# Patient Record
Sex: MALE | Race: WHITE
[De-identification: names, ages, dates, MRNs, and addresses within clinical notes are randomized per-mention and may not be internally consistent; named-entity substitution may affect disease eponyms.]

---

## 2018-03-13 ENCOUNTER — HOSPITAL ENCOUNTER (EMERGENCY)
Dept: HOSPITAL 17 - PHED | Age: 73
Discharge: HOME | End: 2018-03-13
Payer: COMMERCIAL

## 2018-03-13 VITALS
OXYGEN SATURATION: 97 % | DIASTOLIC BLOOD PRESSURE: 83 MMHG | RESPIRATION RATE: 16 BRPM | HEART RATE: 76 BPM | SYSTOLIC BLOOD PRESSURE: 132 MMHG

## 2018-03-13 VITALS
SYSTOLIC BLOOD PRESSURE: 138 MMHG | HEART RATE: 79 BPM | RESPIRATION RATE: 16 BRPM | DIASTOLIC BLOOD PRESSURE: 75 MMHG | OXYGEN SATURATION: 94 %

## 2018-03-13 VITALS
HEART RATE: 76 BPM | DIASTOLIC BLOOD PRESSURE: 80 MMHG | SYSTOLIC BLOOD PRESSURE: 126 MMHG | RESPIRATION RATE: 16 BRPM | OXYGEN SATURATION: 96 %

## 2018-03-13 VITALS
RESPIRATION RATE: 20 BRPM | DIASTOLIC BLOOD PRESSURE: 76 MMHG | OXYGEN SATURATION: 96 % | TEMPERATURE: 97.7 F | HEART RATE: 74 BPM | SYSTOLIC BLOOD PRESSURE: 151 MMHG

## 2018-03-13 VITALS — OXYGEN SATURATION: 94 %

## 2018-03-13 DIAGNOSIS — Z87.891: ICD-10-CM

## 2018-03-13 DIAGNOSIS — E11.9: ICD-10-CM

## 2018-03-13 DIAGNOSIS — J44.9: ICD-10-CM

## 2018-03-13 DIAGNOSIS — I10: ICD-10-CM

## 2018-03-13 DIAGNOSIS — R07.1: Primary | ICD-10-CM

## 2018-03-13 DIAGNOSIS — R05: ICD-10-CM

## 2018-03-13 DIAGNOSIS — E78.00: ICD-10-CM

## 2018-03-13 DIAGNOSIS — R94.31: ICD-10-CM

## 2018-03-13 LAB
ALBUMIN SERPL-MCNC: 3.7 GM/DL (ref 3.4–5)
ALP SERPL-CCNC: 78 U/L (ref 45–117)
ALT SERPL-CCNC: 21 U/L (ref 12–78)
AST SERPL-CCNC: 14 U/L (ref 15–37)
BASOPHILS # BLD AUTO: 0.1 TH/MM3 (ref 0–0.2)
BASOPHILS NFR BLD: 1 % (ref 0–2)
BILIRUB SERPL-MCNC: 0.4 MG/DL (ref 0.2–1)
BUN SERPL-MCNC: 13 MG/DL (ref 7–18)
CALCIUM SERPL-MCNC: 8.5 MG/DL (ref 8.5–10.1)
CHLORIDE SERPL-SCNC: 103 MEQ/L (ref 98–107)
CREAT SERPL-MCNC: 0.9 MG/DL (ref 0.6–1.3)
D-DIMER: (no result) MG/L FEU (ref 0–0.5)
EOSINOPHIL # BLD: 0.2 TH/MM3 (ref 0–0.4)
EOSINOPHIL NFR BLD: 2.8 % (ref 0–4)
ERYTHROCYTE [DISTWIDTH] IN BLOOD BY AUTOMATED COUNT: 13.6 % (ref 11.6–17.2)
GFR SERPLBLD BASED ON 1.73 SQ M-ARVRAT: 83 ML/MIN (ref 89–?)
GLUCOSE SERPL-MCNC: 151 MG/DL (ref 74–106)
HCO3 BLD-SCNC: 25.5 MEQ/L (ref 21–32)
HCT VFR BLD CALC: 44.5 % (ref 39–51)
HGB BLD-MCNC: 15.7 GM/DL (ref 13–17)
INR PPP: 1 RATIO
LYMPHOCYTES # BLD AUTO: 2.1 TH/MM3 (ref 1–4.8)
LYMPHOCYTES NFR BLD AUTO: 32.8 % (ref 9–44)
MAGNESIUM SERPL-MCNC: 2 MG/DL (ref 1.5–2.5)
MCH RBC QN AUTO: 30.5 PG (ref 27–34)
MCHC RBC AUTO-ENTMCNC: 35.2 % (ref 32–36)
MCV RBC AUTO: 86.8 FL (ref 80–100)
MONOCYTE #: 0.3 TH/MM3 (ref 0–0.9)
MONOCYTES NFR BLD: 5.3 % (ref 0–8)
NEUTROPHILS # BLD AUTO: 3.7 TH/MM3 (ref 1.8–7.7)
NEUTROPHILS NFR BLD AUTO: 58.1 % (ref 16–70)
PLATELET # BLD: 206 TH/MM3 (ref 150–450)
PMV BLD AUTO: 8.1 FL (ref 7–11)
PROT SERPL-MCNC: 6.9 GM/DL (ref 6.4–8.2)
PROTHROMBIN TIME: 10.5 SEC (ref 9.8–11.6)
RBC # BLD AUTO: 5.13 MIL/MM3 (ref 4.5–5.9)
SODIUM SERPL-SCNC: 138 MEQ/L (ref 136–145)
TROPONIN I SERPL-MCNC: (no result) NG/ML (ref 0.02–0.05)
WBC # BLD AUTO: 6.4 TH/MM3 (ref 4–11)

## 2018-03-13 PROCEDURE — 96374 THER/PROPH/DIAG INJ IV PUSH: CPT

## 2018-03-13 PROCEDURE — 80053 COMPREHEN METABOLIC PANEL: CPT

## 2018-03-13 PROCEDURE — 82550 ASSAY OF CK (CPK): CPT

## 2018-03-13 PROCEDURE — 71046 X-RAY EXAM CHEST 2 VIEWS: CPT

## 2018-03-13 PROCEDURE — 85610 PROTHROMBIN TIME: CPT

## 2018-03-13 PROCEDURE — 93005 ELECTROCARDIOGRAM TRACING: CPT

## 2018-03-13 PROCEDURE — 84484 ASSAY OF TROPONIN QUANT: CPT

## 2018-03-13 PROCEDURE — 85730 THROMBOPLASTIN TIME PARTIAL: CPT

## 2018-03-13 PROCEDURE — 83880 ASSAY OF NATRIURETIC PEPTIDE: CPT

## 2018-03-13 PROCEDURE — 82552 ASSAY OF CPK IN BLOOD: CPT

## 2018-03-13 PROCEDURE — 83735 ASSAY OF MAGNESIUM: CPT

## 2018-03-13 PROCEDURE — 99285 EMERGENCY DEPT VISIT HI MDM: CPT

## 2018-03-13 PROCEDURE — 85025 COMPLETE CBC W/AUTO DIFF WBC: CPT

## 2018-03-13 PROCEDURE — 85379 FIBRIN DEGRADATION QUANT: CPT

## 2018-03-13 NOTE — RADRPT
EXAM DATE/TIME:  03/13/2018 16:21 

 

[facility] COMPARISON:     

No previous studies available for comparison.

 

                     

INDICATIONS :     

Left side chest pain. Patient states he coughed and has had left side chest pain since. 

                     

 

MEDICAL HISTORY :     

Hypertension.       Bronchitis.    

 

SURGICAL HISTORY :     

None.   

 

ENCOUNTER:     

Initial                                        

 

ACUITY:     

1 day      

 

PAIN SCORE:     

8/10

 

LOCATION:     

Left chest 

 

FINDINGS:     

PA and lateral views of the chest demonstrate the lungs to be symmetrically aerated without evidence 
of mass, infiltrate or effusion.  The cardiomediastinal contours are unremarkable.  There is elevatio
n of the right hemidiaphragm. Osseous structures are intact.

 

CONCLUSION:     Elevation of the right hemidiaphragm. No acute focal pulmonary infiltrate or pulmonar
y vascular congestion. 

 

 

 Blayne Page MD on March 13, 2018 at 16:43           

Board Certified Radiologist.

 This report was verified electronically.

## 2018-03-13 NOTE — PD
HPI


Chief Complaint:  Pain: Acute or Chronic


Time Seen by Provider:  16:01


Travel History


International Travel<30 days:  No


Contact w/Intl Traveler<30days:  No


Traveled to known affect area:  No





History of Present Illness


HPI


This is a 73-year-old male presents here complaining of pain in his chest.  

Patient has been coughing for the last 2 weeks, cough is productive of yellow 

sputum.  Pain is 7 out of 10, located in the mid chest and radiates to the left

, worse when he coughs or takes a deep breath, sharp, no fever or chills or 

night sweats.  Patient reports taking amoxicillin for his bronchitis.  Patient 

has a history of COPD but does not use home oxygen.  He used to smoke but quit 

"many years ago".





PFSH


Past Medical History


Heart Rhythm Problems:  Yes


High Cholesterol:  Yes


COPD:  Yes


Diabetes:  Yes


Diminished Hearing:  No


Hypertension:  Yes


Respiratory:  Yes (BRONCHITIS)





Social History


Alcohol Use:  No


Tobacco Use:  No


Substance Use:  No





Allergies-Medications


(Allergen,Severity, Reaction):  


Coded Allergies:  


     No Known Allergies (Unverified , 3/13/15)


Reported Meds & Prescriptions





Reported Meds & Active Scripts


Active


Robitussin Nighttime Cough Liq (Doxylamine-Dextromethorphan Liq) 12.5-30 Mg/10 

Ml Soln 10 Ml PO Q6H PRN


EC-Naprosyn (Naproxen) 375 Mg Tabdr 375 Mg PO BID


Mobic (Meloxicam) 15 Mg Tab 15 Mg PO DAILY


Clindamycin Hcl (Clindamycin HCl) 150 Mg Cap 2 Tab PO QID


Reported


Symbicort (Budesonide/Formoterol Fumarate) 160 Mcg/4.5 Mcg Aer 2 Puff INH BID


     * SHAKE WELL BEFORE USE *


Hydrocodone/Acetaminophen (Miscellaneous Medication) 1 Tab 1 Tab PO Q4H PRN


Nitrostat (Nitroglycerin) 0.4 Mg Sub 0.4 Mg SL AS DIRECTED


Th Potassium Gluconate (Potassium Gluconate) 595 Mg Tab 595 Mg PO DAILY


Aspir-81 (Aspirin) 81 Mg Tab 81 Mg PO DAILY


Hydrochlorothiazide (Miscellaneous Medication) 12.5 Mg Cap 12.5 Mg PO DAILY


Glucophage 500 mg (Metformin HCl) 500 Mg Tab 500 Mg PO DAILY


Zocor 40 mg (Simvastatin) 40 Mg Tab 10 Mg PO HS


Flomax (Tamsulosin HCl) 0.4 Mg Cap 0.4 Mg PO DAILY








Review of Systems


Except as stated in HPI:  all other systems reviewed are Neg





Physical Exam


Narrative


GENERAL: Alert oriented 3 no acute distress


SKIN: Focused skin assessment warm/dry.


HEAD: Atraumatic. Normocephalic. 


EYES: Pupils equal and round. No scleral icterus. No injection or drainage. 


ENT: No nasal bleeding or discharge.  Mucous membranes pink and moist.


NECK: Trachea midline. No JVD. 


CARDIOVASCULAR: Regular rate and rhythm.  No murmur appreciated.


RESPIRATORY: No accessory muscle use. Clear to auscultation. Breath sounds 

equal bilaterally. 


GASTROINTESTINAL: Abdomen soft, non-tender, nondistended. Hepatic and splenic 

margins not palpable. 


MUSCULOSKELETAL: No obvious deformities. No clubbing.  No cyanosis.  No edema. 


NEUROLOGICAL: Awake and alert. No obvious cranial nerve deficits.  Motor 

grossly within normal limits. Normal speech.


PSYCHIATRIC: Appropriate mood and affect; insight and judgment normal.





Data


Data


Last Documented VS





Vital Signs








  Date Time  Temp Pulse Resp B/P (MAP) Pulse Ox O2 Delivery O2 Flow Rate FiO2


 


3/13/18 18:41        


 


3/13/18 18:37  76 16  97 Room Air  


 


3/13/18 17:29       2.00 


 


3/13/18 16:10 97.7       








Orders





 Orders


Electrocardiogram (3/13/18 16:10)


B-Type Natriuretic Peptide (3/13/18 16:10)


Ckmb (Isoenzyme) Profile (3/13/18 16:10)


Complete Blood Count With Diff (3/13/18 16:10)


Comprehensive Metabolic Panel (3/13/18 16:10)


D-Dimer (3/13/18 16:10)


Magnesium (Mg) (3/13/18 16:10)


Prothrombin Time / Inr (Pt) (3/13/18 16:10)


Act Partial Throm Time (Ptt) (3/13/18 16:10)


Troponin I (3/13/18 16:10)


Ecg Monitoring (3/13/18 16:10)


Bilateral Bp Monitoring (3/13/18 16:10)


Iv Access Insert/Monitor (3/13/18 16:10)


Oximetry (3/13/18 16:10)


Oxygen Administration (3/13/18 16:10)


Aspirin (Aspirin) (3/13/18 16:15)


Sodium Chloride 0.9% Flush (Ns Flush) (3/13/18 16:15)


Chest, Pa & Lat (3/13/18 16:10)


Morphine Inj (Morphine Inj) (3/13/18 16:15)


CKMB (3/13/18 16:40)


CKMB% (3/13/18 16:40)


Ed Discharge Order (3/13/18 17:55)





Labs





Laboratory Tests








Test


  3/13/18


16:40


 


White Blood Count 6.4 TH/MM3 


 


Red Blood Count 5.13 MIL/MM3 


 


Hemoglobin 15.7 GM/DL 


 


Hematocrit 44.5 % 


 


Mean Corpuscular Volume 86.8 FL 


 


Mean Corpuscular Hemoglobin 30.5 PG 


 


Mean Corpuscular Hemoglobin


Concent 35.2 % 


 


 


Red Cell Distribution Width 13.6 % 


 


Platelet Count 206 TH/MM3 


 


Mean Platelet Volume 8.1 FL 


 


Neutrophils (%) (Auto) 58.1 % 


 


Lymphocytes (%) (Auto) 32.8 % 


 


Monocytes (%) (Auto) 5.3 % 


 


Eosinophils (%) (Auto) 2.8 % 


 


Basophils (%) (Auto) 1.0 % 


 


Neutrophils # (Auto) 3.7 TH/MM3 


 


Lymphocytes # (Auto) 2.1 TH/MM3 


 


Monocytes # (Auto) 0.3 TH/MM3 


 


Eosinophils # (Auto) 0.2 TH/MM3 


 


Basophils # (Auto) 0.1 TH/MM3 


 


CBC Comment DIFF FINAL 


 


Differential Comment  


 


Prothrombin Time 10.5 SEC 


 


Prothromb Time International


Ratio 1.0 RATIO 


 


 


Activated Partial


Thromboplast Time 24.0 SEC 


 


 


D-Dimer Quantitative (PE/DVT)


  LESS THAN 0.19


MG/L FEU


 


Blood Urea Nitrogen 13 MG/DL 


 


Creatinine 0.90 MG/DL 


 


Random Glucose 151 MG/DL 


 


Total Protein 6.9 GM/DL 


 


Albumin 3.7 GM/DL 


 


Calcium Level 8.5 MG/DL 


 


Magnesium Level 2.0 MG/DL 


 


Alkaline Phosphatase 78 U/L 


 


Aspartate Amino Transf


(AST/SGOT) 14 U/L 


 


 


Alanine Aminotransferase


(ALT/SGPT) 21 U/L 


 


 


Total Bilirubin 0.4 MG/DL 


 


Sodium Level 138 MEQ/L 


 


Potassium Level 3.3 MEQ/L 


 


Chloride Level 103 MEQ/L 


 


Carbon Dioxide Level 25.5 MEQ/L 


 


Anion Gap 10 MEQ/L 


 


Estimat Glomerular Filtration


Rate 83 ML/MIN 


 


 


Total Creatine Kinase 210 U/L 


 


Creatine Kinase MB 3.6 NG/ML 


 


Troponin I


  LESS THAN 0.02


NG/ML


 


B-Type Natriuretic Peptide 41 PG/ML 











[facility]


Medical Decision Making


Medical Screen Exam Complete:  Yes


Emergency Medical Condition:  Yes


Differential Diagnosis


Acute coronary syndrome, bronchitis, pneumonia, pneumothorax.


Narrative Course


This is a 73-year-old male who presented complaining of chest pain pain seems 

to be pleuritic in nature since patient says every time he coughs or takes a 

deep breath it causes severe pain.  Patient was being treated for bronchitis 

for the last few weeks and states that his pain has been getting worse in the 

last week.  Labs are unremarkable, troponins negative EKG is unremarkable, 

chest x-ray shows no infiltrate.  I believe this patient is stable to be 

discharged since his pain does not seem to be of a cardiac origin although I 

discussed with the patient that if symptoms do not improve with pain 

medications he needs to come to the ER immediately or if his pain becomes 

constant and not related to cough.  I also discussed with patient the 

importance of following up with a cardiology and pulmonology for repeat chest x-

ray as needed.  Patient is a plan of care.


Laboratory Tests








Test


  3/13/18


16:40


 


White Blood Count


  6.4 TH/MM3


(4.0-11.0)


 


Red Blood Count


  5.13 MIL/MM3


(4.50-5.90)


 


Hemoglobin


  15.7 GM/DL


(13.0-17.0)


 


Hematocrit


  44.5 %


(39.0-51.0)


 


Mean Corpuscular Volume


  86.8 FL


(80.0-100.0)


 


Mean Corpuscular Hemoglobin


  30.5 PG


(27.0-34.0)


 


Mean Corpuscular Hemoglobin


Concent 35.2 %


(32.0-36.0)


 


Red Cell Distribution Width


  13.6 %


(11.6-17.2)


 


Platelet Count


  206 TH/MM3


(150-450)


 


Mean Platelet Volume


  8.1 FL


(7.0-11.0)


 


Neutrophils (%) (Auto)


  58.1 %


(16.0-70.0)


 


Lymphocytes (%) (Auto)


  32.8 %


(9.0-44.0)


 


Monocytes (%) (Auto)


  5.3 %


(0.0-8.0)


 


Eosinophils (%) (Auto)


  2.8 %


(0.0-4.0)


 


Basophils (%) (Auto)


  1.0 %


(0.0-2.0)


 


Neutrophils # (Auto)


  3.7 TH/MM3


(1.8-7.7)


 


Lymphocytes # (Auto)


  2.1 TH/MM3


(1.0-4.8)


 


Monocytes # (Auto)


  0.3 TH/MM3


(0-0.9)


 


Eosinophils # (Auto)


  0.2 TH/MM3


(0-0.4)


 


Basophils # (Auto)


  0.1 TH/MM3


(0-0.2)


 


CBC Comment DIFF FINAL 


 


Differential Comment  


 


Prothrombin Time


  10.5 SEC


(9.8-11.6)


 


Prothromb Time International


Ratio 1.0 RATIO 


 


 


Activated Partial


Thromboplast Time 24.0 SEC


(24.3-30.1)


 


D-Dimer Quantitative (PE/DVT)


  LESS THAN 0.19


MG/L FEU


 


Blood Urea Nitrogen


  13 MG/DL


(7-18)


 


Creatinine


  0.90 MG/DL


(0.60-1.30)


 


Random Glucose


  151 MG/DL


()


 


Total Protein


  6.9 GM/DL


(6.4-8.2)


 


Albumin


  3.7 GM/DL


(3.4-5.0)


 


Calcium Level


  8.5 MG/DL


(8.5-10.1)


 


Magnesium Level


  2.0 MG/DL


(1.5-2.5)


 


Alkaline Phosphatase


  78 U/L


()


 


Aspartate Amino Transf


(AST/SGOT) 14 U/L (15-37) 


 


 


Alanine Aminotransferase


(ALT/SGPT) 21 U/L (12-78) 


 


 


Total Bilirubin


  0.4 MG/DL


(0.2-1.0)


 


Sodium Level


  138 MEQ/L


(136-145)


 


Potassium Level


  3.3 MEQ/L


(3.5-5.1)


 


Chloride Level


  103 MEQ/L


()


 


Carbon Dioxide Level


  25.5 MEQ/L


(21.0-32.0)


 


Anion Gap


  10 MEQ/L


(5-15)


 


Estimat Glomerular Filtration


Rate 83 ML/MIN


(>89)


 


Total Creatine Kinase


  210 U/L


()


 


Creatine Kinase MB


  3.6 NG/ML


(0.5-3.6)


 


Troponin I


  LESS THAN 0.02


NG/ML


 


B-Type Natriuretic Peptide


  41 PG/ML


(0-100)








Last 24 hours Impressions








Chest X-Ray 3/13/18 1610 Signed





Impressions: 





 Service Date/Time:  Tuesday, March 13, 2018 16:21 - CONCLUSION: Elevation of 

the 





 right hemidiaphragm. No acute focal pulmonary infiltrate or pulmonary vascular 





 congestion.     Blayne Page MD 











Diagnosis





 Primary Impression:  


 Pleuritic chest pain


Scripts


Doxylamine-Dextromethorphan Liq (Robitussin Nighttime Cough Liq) 12.5-30 Mg/10 

Ml Soln


10 ML PO Q6H Y for COUGH AND/OR COLD SYMPTOMS, #1 BOTTLE 0 Refills


   Prov: Artem Churchill MD         3/13/18 


Naproxen DR (EC-Naprosyn) 375 Mg Tabdr


375 MG PO BID, #20 TAB 0 Refills


   Prov: Artem Churchill MD         3/13/18


Disposition:  01 DISCHARGE HOME


Condition:  Stable











Artem Churchill MD Mar 13, 2018 16:35

## 2018-03-14 NOTE — EKG
Date Performed: 03/13/2018       Time Performed: 16:02:48

 

PTAGE:      73 years

 

EKG:      Sinus rhythm 

 

 MARKED LEFT AXIS DEVIATION RIGHT BUNDLE BRANCH BLOCK ABNORMAL ECG INTERPRETATION BASED ON A DEFAULT 
AGE OF 40 YEARS 

 

NO PREVIOUS TRACING            

 

DOCTOR:   Travis Quesada  Interpretating Date/Time  03/14/2018 23:38:10